# Patient Record
Sex: MALE | URBAN - METROPOLITAN AREA
[De-identification: names, ages, dates, MRNs, and addresses within clinical notes are randomized per-mention and may not be internally consistent; named-entity substitution may affect disease eponyms.]

---

## 2019-01-02 NOTE — PROCEDURE NOTE: CLINICAL
PROCEDURE NOTE: Eylea Sample OS. Diagnosis: Neovascular AMD with Active CNV. Anesthesia: Akten Gel 3.5%. Prep: Betadine Drops. Prior to injection, risks/benefits/alternatives discussed including infection, loss of vision, hemorrhage, cataract, glaucoma, retinal tears or detachment. The patient wished to proceed with treatment. Betadine prep was performed. Topical anesthesia was induced with Alcaine. Additional anesthesia was achieved using drop(s) or injection checked above. A drop of Povidone-iodine 5% ophthalmic solution was instilled over the injection site and in the inferior fornix. Using the syringe provided, Eylea 2.0mg in 0.05 cc was injected into the vitreous cavity. The remainder of the Eylea in the single-use vial was then discarded in a medical waste disposal container. The needle was passed 3.0 mm posterior to the limbus in pseudophakic patients, and 3.5 mm posterior to the limbus in phakic patients. The eye was irrigated with sterile irrigating solution. Patient tolerated procedure well. There were no complications. Post procedure instructions given. Injection time: *. The patient was instructed to return for re-evaluation in approximately 4-12 weeks depending on his/her condition and was told to call immediately if vision decreases and/or if his/her eye becomes red, painful, and/or light sensitive. The patient was instructed to go to the emergency room or call 911 if unable to reach the doctor within an hour or two of trying or calling. The patient was instructed to use Artificial Tears q.i.d. p.r.n for comfort. Everett Mejia

## 2019-03-04 NOTE — PROCEDURE NOTE: CLINICAL
PROCEDURE NOTE: Eylea Sample OS. Diagnosis: Neovascular AMD with Active CNV. Prior to injection, risks/benefits/alternatives discussed including infection, loss of vision, hemorrhage, cataract, glaucoma, retinal tears or detachment. The patient wished to proceed with treatment. Betadine prep was performed. Topical anesthesia was induced with Alcaine. Additional anesthesia was achieved using drop(s) or injection checked above. A drop of Povidone-iodine 5% ophthalmic solution was instilled over the injection site and in the inferior fornix. Using the syringe provided, Eylea 2.0mg in 0.05 cc was injected into the vitreous cavity. The remainder of the Eylea in the single-use vial was then discarded in a medical waste disposal container. The needle was passed 3.0 mm posterior to the limbus in pseudophakic patients, and 3.5 mm posterior to the limbus in phakic patients. The eye was irrigated with sterile irrigating solution. Patient tolerated procedure well. There were no complications. Post procedure instructions given. Injection time: *. The patient was instructed to return for re-evaluation in approximately 4-12 weeks depending on his/her condition and was told to call immediately if vision decreases and/or if his/her eye becomes red, painful, and/or light sensitive. The patient was instructed to go to the emergency room or call 911 if unable to reach the doctor within an hour or two of trying or calling. The patient was instructed to use Artificial Tears q.i.d. p.r.n for comfort. Baldemar Flores

## 2019-04-03 NOTE — PROCEDURE NOTE: CLINICAL
PROCEDURE NOTE: Eylea Sample OS. Diagnosis: Neovascular AMD with Active CNV. Anesthesia: Lidocaine 4%. Prep: Betadine Drops. Prior to injection, risks/benefits/alternatives discussed including infection, loss of vision, hemorrhage, cataract, glaucoma, retinal tears or detachment. The patient wished to proceed with treatment. Betadine prep was performed. Topical anesthesia was induced with Alcaine. Additional anesthesia was achieved using drop(s) or injection checked above. A drop of Povidone-iodine 5% ophthalmic solution was instilled over the injection site and in the inferior fornix. Using the syringe provided, Eylea 2.0mg in 0.05 cc was injected into the vitreous cavity. The remainder of the Eylea in the single-use vial was then discarded in a medical waste disposal container. The needle was passed 3.0 mm posterior to the limbus in pseudophakic patients, and 3.5 mm posterior to the limbus in phakic patients. The eye was irrigated with sterile irrigating solution. Patient tolerated procedure well. There were no complications. Post procedure instructions given. Injection time: 1:38PM. The patient was instructed to return for re-evaluation in approximately 4-12 weeks depending on his/her condition and was told to call immediately if vision decreases and/or if his/her eye becomes red, painful, and/or light sensitive. The patient was instructed to go to the emergency room or call 911 if unable to reach the doctor within an hour or two of trying or calling. The patient was instructed to use Artificial Tears q.i.d. p.r.n for comfort. Whitley Gama

## 2020-01-02 NOTE — PATIENT DISCUSSION
EYLEA SAMPLE AND PT TO [facility] IN [place] IN FEBRUARY 2020 - PT DID NOT WANT TO TX - ACCEPTS RISK OF IT GETTING WORSE AND PERMANENT VISION LOSS.

## 2020-01-02 NOTE — PROCEDURE NOTE: CLINICAL
PROCEDURE NOTE: Eylea Sample OS. Diagnosis: Neovascular AMD with Active CNV. Anesthesia: Akten Gel 3.5%. Prep: Betadine Drops. Prior to injection, risks/benefits/alternatives discussed including infection, loss of vision, hemorrhage, cataract, glaucoma, retinal tears or detachment. The patient wished to proceed with treatment. Betadine prep was performed. Topical anesthesia was induced with Alcaine. Additional anesthesia was achieved using drop(s) or injection checked above. A drop of Povidone-iodine 5% ophthalmic solution was instilled over the injection site and in the inferior fornix. Using the syringe provided, Eylea 2.0mg in 0.05 cc was injected into the vitreous cavity. The remainder of the Eylea in the single-use vial was then discarded in a medical waste disposal container. The needle was passed 3.0 mm posterior to the limbus in pseudophakic patients, and 3.5 mm posterior to the limbus in phakic patients. The eye was irrigated with sterile irrigating solution. Patient tolerated procedure well. There were no complications. Post procedure instructions given. Injection time: 12:16PM. The patient was instructed to return for re-evaluation in approximately 4-12 weeks depending on his/her condition and was told to call immediately if vision decreases and/or if his/her eye becomes red, painful, and/or light sensitive. The patient was instructed to go to the emergency room or call 911 if unable to reach the doctor within an hour or two of trying or calling. The patient was instructed to use Artificial Tears q.i.d. p.r.n for comfort. Kp Beltran

## 2020-02-11 ENCOUNTER — IMPORTED ENCOUNTER (OUTPATIENT)
Dept: URBAN - METROPOLITAN AREA CLINIC 9 | Facility: CLINIC | Age: 55
End: 2020-02-11

## 2020-02-11 PROBLEM — H25.13: Noted: 2020-02-11

## 2020-02-11 PROBLEM — H52.13: Noted: 2020-02-11

## 2020-03-18 NOTE — PATIENT DISCUSSION
EYLEA SAMPLE & [facility] IN [place] IN 5 WKS - MILD EDEMA WITH NEW SRH AT 11 WKS - RETX AND RECOM NOT DEFERRING TX IN FUTURE TO PREVENT FURTHER EPISODES OF SRH - TO TAP AND INJECT 2ND TO IOP.

## 2020-03-18 NOTE — PROCEDURE NOTE: CLINICAL
PROCEDURE NOTE: Eylea Sample OS. Diagnosis: Neovascular AMD with Active CNV. Anesthesia: Akten Gel 3.5%. Prep: Betadine Drops. Prior to injection, risks/benefits/alternatives discussed including infection, loss of vision, hemorrhage, cataract, glaucoma, retinal tears or detachment. The patient wished to proceed with treatment. Betadine prep was performed. Topical anesthesia was induced with Alcaine. Additional anesthesia was achieved using drop(s) or injection checked above. A drop of Povidone-iodine 5% ophthalmic solution was instilled over the injection site and in the inferior fornix. Using the syringe provided, Eylea 2.0mg in 0.05 cc was injected into the vitreous cavity. The remainder of the Eylea in the single-use vial was then discarded in a medical waste disposal container. The needle was passed 3.0 mm posterior to the limbus in pseudophakic patients, and 3.5 mm posterior to the limbus in phakic patients. The eye was irrigated with sterile irrigating solution. Patient tolerated procedure well. There were no complications. Post procedure instructions given. Injection time: *. The patient was instructed to return for re-evaluation in approximately 4-12 weeks depending on his/her condition and was told to call immediately if vision decreases and/or if his/her eye becomes red, painful, and/or light sensitive. The patient was instructed to go to the emergency room or call 911 if unable to reach the doctor within an hour or two of trying or calling. The patient was instructed to use Artificial Tears q.i.d. p.r.n for comfort. Pennye Hand PROCEDURE NOTE: A/C Paracentesis, Therapeutic OS. Diagnosis: Glaucoma. Anesthesia: Topical. Prep: Betadine Prep. Prior to procedure, risks/benefits/alternatives discussed including infection, loss of vision, hemorrhage, cataract, retinal tears or detachment and patient wished to proceed. All questions asked by the patient were answered in detail. Betadine prep was performed. Location of Paracentesis: Temporal Limbus. Volume of tap: 0.1 ml. Patient tolerated procedure well. There were no complications. Anti-biotic drops were then instilled into the post-operative eye. Post procedure AND EYLEA INJECTION IOP = 11 mmHg. Post procedure instructions given. Patient given office phone number/answering service number and advised to call immediately should there be an increase in floaters or redness, loss of vision or pain, or should they have any other questions or concerns. Karyna Aguayo

## 2021-10-16 ASSESSMENT — VISUAL ACUITY
OS_PH: 20/20 SN
OD_SC: 20/30 - SN
OS_CC: 20/20 SN
OD_CC: 20/20 SN
OS_SC: 20/50 SN

## 2021-10-16 ASSESSMENT — TONOMETRY
OD_IOP_MMHG: 12
OS_IOP_MMHG: 13

## 2023-01-31 ENCOUNTER — ESTABLISHED PATIENT (OUTPATIENT)
Dept: URBAN - METROPOLITAN AREA CLINIC 17 | Facility: CLINIC | Age: 58
End: 2023-01-31

## 2023-01-31 DIAGNOSIS — H25.13: ICD-10-CM

## 2023-01-31 PROCEDURE — 92015 DETERMINE REFRACTIVE STATE: CPT

## 2023-01-31 PROCEDURE — 92014 COMPRE OPH EXAM EST PT 1/>: CPT

## 2023-01-31 ASSESSMENT — VISUAL ACUITY
OD_SC: 20/40-
OU_SC: 20/30
OS_SC: 20/40

## 2023-01-31 ASSESSMENT — TONOMETRY
OD_IOP_MMHG: 15
OS_IOP_MMHG: 17

## 2024-02-02 NOTE — PATIENT DISCUSSION
Discussed the importance of blood sugar control in the prevention of ocular complications. Recd authorization and request from patient to have all medical records from 1-30-23 to 1-30-[address] [facility] [address], [address], [place] [address] with phone [phone] and fax [phone]. Faxed to [facility] for processing.